# Patient Record
Sex: FEMALE | Race: WHITE | Employment: UNEMPLOYED | URBAN - NONMETROPOLITAN AREA
[De-identification: names, ages, dates, MRNs, and addresses within clinical notes are randomized per-mention and may not be internally consistent; named-entity substitution may affect disease eponyms.]

---
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
## 2022-12-29 ENCOUNTER — OFFICE VISIT (OUTPATIENT)
Dept: FAMILY MEDICINE CLINIC | Age: 22
End: 2022-12-29
Payer: COMMERCIAL

## 2022-12-29 VITALS
BODY MASS INDEX: 19.33 KG/M2 | OXYGEN SATURATION: 98 % | HEIGHT: 65 IN | SYSTOLIC BLOOD PRESSURE: 140 MMHG | DIASTOLIC BLOOD PRESSURE: 90 MMHG | WEIGHT: 116 LBS | HEART RATE: 100 BPM

## 2022-12-29 DIAGNOSIS — Z83.2 FAMILY HISTORY OF IRON DEFICIENCY: ICD-10-CM

## 2022-12-29 DIAGNOSIS — Z00.00 ENCOUNTER FOR WELL ADULT EXAM WITHOUT ABNORMAL FINDINGS: Primary | ICD-10-CM

## 2022-12-29 DIAGNOSIS — Z91.018 NUT ALLERGY: ICD-10-CM

## 2022-12-29 DIAGNOSIS — R53.83 OTHER FATIGUE: ICD-10-CM

## 2022-12-29 PROCEDURE — 99395 PREV VISIT EST AGE 18-39: CPT

## 2022-12-29 RX ORDER — EPINEPHRINE 0.3 MG/.3ML
INJECTION SUBCUTANEOUS
Qty: 2 EACH | Refills: 1 | Status: SHIPPED | OUTPATIENT
Start: 2022-12-29

## 2022-12-29 SDOH — ECONOMIC STABILITY: FOOD INSECURITY: WITHIN THE PAST 12 MONTHS, THE FOOD YOU BOUGHT JUST DIDN'T LAST AND YOU DIDN'T HAVE MONEY TO GET MORE.: NEVER TRUE

## 2022-12-29 SDOH — ECONOMIC STABILITY: FOOD INSECURITY: WITHIN THE PAST 12 MONTHS, YOU WORRIED THAT YOUR FOOD WOULD RUN OUT BEFORE YOU GOT MONEY TO BUY MORE.: NEVER TRUE

## 2022-12-29 ASSESSMENT — PATIENT HEALTH QUESTIONNAIRE - PHQ9
SUM OF ALL RESPONSES TO PHQ QUESTIONS 1-9: 0
1. LITTLE INTEREST OR PLEASURE IN DOING THINGS: 0
2. FEELING DOWN, DEPRESSED OR HOPELESS: 0
SUM OF ALL RESPONSES TO PHQ9 QUESTIONS 1 & 2: 0
SUM OF ALL RESPONSES TO PHQ QUESTIONS 1-9: 0

## 2022-12-29 ASSESSMENT — ENCOUNTER SYMPTOMS
COLOR CHANGE: 0
SINUS PRESSURE: 0
SINUS PAIN: 0
SHORTNESS OF BREATH: 0
WHEEZING: 0
CHEST TIGHTNESS: 0
CONSTIPATION: 0
BACK PAIN: 0
EYE DISCHARGE: 0
ABDOMINAL PAIN: 0
RHINORRHEA: 0
DIARRHEA: 0
NAUSEA: 0
COUGH: 0
EYE ITCHING: 0

## 2022-12-29 ASSESSMENT — SOCIAL DETERMINANTS OF HEALTH (SDOH): HOW HARD IS IT FOR YOU TO PAY FOR THE VERY BASICS LIKE FOOD, HOUSING, MEDICAL CARE, AND HEATING?: NOT HARD AT ALL

## 2022-12-29 NOTE — PROGRESS NOTES
Well Adult Note  Name: Jose F Cantu Date: 2022   MRN: [id number] Sex: Female   Age: 23 y.o. Ethnicity: Non- / Non    : 2000 Race: White (non-)      Yolie Yip is here for well adult exam.  History:  Slight fatigue, family history of iron deficiency anemia, nut allergy      Review of Systems   Constitutional:  Positive for fatigue. Negative for chills, fever and unexpected weight change. HENT:  Negative for congestion, ear pain, rhinorrhea, sinus pressure and sinus pain. Eyes:  Negative for discharge, itching and visual disturbance. Respiratory:  Negative for cough, chest tightness, shortness of breath and wheezing. Cardiovascular:  Negative for chest pain, palpitations and leg swelling. Gastrointestinal:  Negative for abdominal pain, constipation, diarrhea and nausea. Endocrine: Negative for cold intolerance, heat intolerance, polydipsia and polyphagia. Genitourinary:  Negative for dysuria, flank pain and frequency. Musculoskeletal:  Negative for arthralgias, back pain and myalgias. Skin:  Negative for color change. Allergic/Immunologic: Negative for environmental allergies and food allergies. Neurological:  Negative for dizziness, weakness, light-headedness, numbness and headaches. Psychiatric/Behavioral:  Negative for agitation, behavioral problems and confusion. The patient is not nervous/anxious. Allergies   Allergen Reactions    Other Other (See Comments)     Tree nuts; had allergy testing when younger that showed tree nut allergy so she has never had any to see what reaction    Peanut-Containing Drug Products Hives         Prior to Visit Medications    Medication Sig Taking?  Authorizing Provider   EPINEPHrine (EPIPEN) 0.3 MG/0.3ML SOAJ injection Use as directed for allergic reaction Yes JORGE Mcdonough - CNP         Past Medical History:   Diagnosis Date    H/O pulmonary artery stenosis      [facility]     Peanut allergy 2006    Tree nut allergy 2006       History reviewed. No pertinent surgical history. Family History   Problem Relation Age of Onset    Allergies Brother         peanut allergy    Allergies Mother     Asthma Paternal Aunt     Asthma Maternal Cousin     Asthma Paternal Cousin        Social History     Tobacco Use    Smoking status: Never    Smokeless tobacco: Never       Objective   BP (!) 140/90 (Site: Right Upper Arm, Position: Sitting, Cuff Size: Medium Adult)   Pulse 100   Ht 5' 5\" (1.651 m)   Wt 116 lb (52.6 kg)   SpO2 98%   BMI 19.30 kg/m²   Wt Readings from Last 3 Encounters:   12/29/22 116 lb (52.6 kg)   07/26/19 118 lb (53.5 kg) (43 %, Z= -0.17)*   05/29/18 117 lb (53.1 kg) (49 %, Z= -0.03)*     * Growth percentiles are based on [facility] (Girls, 2-20 Years) data. There were no vitals filed for this visit. Physical Exam  Vitals and nursing note reviewed. Constitutional:       General: She is not in acute distress. Appearance: Normal appearance. She is not ill-appearing. HENT:      Head: Normocephalic and atraumatic. Right Ear: Tympanic membrane and external ear normal.      Left Ear: Tympanic membrane and external ear normal.      Nose: Nose normal. No congestion or rhinorrhea. Mouth/Throat:      Mouth: Mucous membranes are moist.      Pharynx: Oropharynx is clear. No posterior oropharyngeal erythema. Eyes:      Pupils: Pupils are equal, round, and reactive to light. Cardiovascular:      Rate and Rhythm: Normal rate and regular rhythm. Pulses: Normal pulses. Heart sounds: Normal heart sounds. Pulmonary:      Effort: Pulmonary effort is normal.      Breath sounds: Normal breath sounds. No wheezing or rhonchi. Abdominal:      General: Bowel sounds are normal.      Palpations: There is no mass. Tenderness: There is no abdominal tenderness. Musculoskeletal:         General: No swelling or tenderness. Normal range of motion.       Cervical back: Normal range of motion. No rigidity or tenderness. Skin:     General: Skin is warm and dry. Capillary Refill: Capillary refill takes less than 2 seconds. Coloration: Skin is pale. Findings: No erythema. Neurological:      General: No focal deficit present. Mental Status: She is alert and oriented to person, place, and time. Psychiatric:         Mood and Affect: Mood normal.         Behavior: Behavior normal.         Thought Content: Thought content normal.         Judgment: Judgment normal.         Assessment   Plan   1. Encounter for well adult exam without abnormal findings  2. Nut allergy  -     EPINEPHrine (EPIPEN) 0.3 MG/0.3ML SOAJ injection; Use as directed for allergic reaction, Disp-2 each, R-1Dispense up to 3 duo packsNormal  3. Other fatigue  -     CBC with Auto Differential; Future  -     Comprehensive Metabolic Panel, Fasting; Future  4.  Family history of iron deficiency  -     CBC with Auto Differential; Future         Personalized Preventive Plan   Current Health Maintenance Status  Immunization History   Administered Date(s) Administered    COVID-19, PFIZER GRAY top, DO NOT Dilute, (age 15 y+), IM, 30 mcg/0.3 mL 07/27/2022, 08/17/2022    DTaP 2002, 02/05/2003, 04/02/2003, 01/07/2004, 06/27/2007    Hepatitis A 04/09/2010, 09/12/2014    Hepatitis B 2000, 2002, 04/02/2003    Hib, unspecified 2002, 02/05/2003, 04/02/2003, 11/05/2003, 11/06/2008    Influenza Nasal 11/19/2010, 12/17/2010    MMR 11/05/2003, 06/27/2007    Meningococcal B, OMV (Bexsero) 07/26/2019    Meningococcal MCV4O (Menveo) 07/26/2019    Meningococcal MCV4P (Menactra) 09/12/2014    Polio IPV (IPOL) 2002, 02/05/2003, 04/02/2003, 06/27/2007    Tdap (Boostrix, Adacel) 09/12/2014    Varicella (Varivax) 11/05/2003, 04/09/2010        Health Maintenance   Topic Date Due    HPV vaccine (1 - 2-dose series) Never done    Depression Screen  Never done    HIV screen  Never done    Chlamydia/GC screen Never done    Hepatitis C screen  Never done    COVID-19 Vaccine (3 - Booster for Alvarado Peter series) 12/29/2023 (Originally 10/12/2022)    DTaP/Tdap/Td vaccine (7 - Td or Tdap) 09/12/2024    Hepatitis A vaccine  Completed    Hib vaccine  Completed    Varicella vaccine  Completed    Meningococcal (ACWY) vaccine  Completed    Flu vaccine  Completed    Pneumococcal 0-64 years Vaccine  Aged Out     Recommendations for Preventive Services Due: see orders and patient instructions/AVS.    Return in about 1 year (around 12/29/2023), or if symptoms worsen or fail to improve.

## 2023-03-29 ENCOUNTER — OFFICE VISIT (OUTPATIENT)
Dept: FAMILY MEDICINE CLINIC | Age: 23
End: 2023-03-29
Payer: COMMERCIAL

## 2023-03-29 VITALS
WEIGHT: 113 LBS | DIASTOLIC BLOOD PRESSURE: 84 MMHG | SYSTOLIC BLOOD PRESSURE: 130 MMHG | BODY MASS INDEX: 18.8 KG/M2 | TEMPERATURE: 99.6 F | HEART RATE: 112 BPM | OXYGEN SATURATION: 97 %

## 2023-03-29 DIAGNOSIS — J02.0 ACUTE STREPTOCOCCAL PHARYNGITIS: Primary | ICD-10-CM

## 2023-03-29 DIAGNOSIS — J02.9 SORE THROAT: ICD-10-CM

## 2023-03-29 LAB — S PYO AG THROAT QL: POSITIVE

## 2023-03-29 PROCEDURE — 87880 STREP A ASSAY W/OPTIC: CPT | Performed by: NURSE PRACTITIONER

## 2023-03-29 PROCEDURE — 99213 OFFICE O/P EST LOW 20 MIN: CPT | Performed by: NURSE PRACTITIONER

## 2023-03-29 RX ORDER — AMOXICILLIN 400 MG/5ML
500 POWDER, FOR SUSPENSION ORAL 2 TIMES DAILY
Qty: 126 ML | Refills: 0 | Status: SHIPPED | OUTPATIENT
Start: 2023-03-29 | End: 2023-04-08

## 2023-03-29 SDOH — ECONOMIC STABILITY: FOOD INSECURITY: WITHIN THE PAST 12 MONTHS, THE FOOD YOU BOUGHT JUST DIDN'T LAST AND YOU DIDN'T HAVE MONEY TO GET MORE.: NEVER TRUE

## 2023-03-29 SDOH — ECONOMIC STABILITY: HOUSING INSECURITY
IN THE LAST 12 MONTHS, WAS THERE A TIME WHEN YOU DID NOT HAVE A STEADY PLACE TO SLEEP OR SLEPT IN A SHELTER (INCLUDING NOW)?: NO

## 2023-03-29 SDOH — ECONOMIC STABILITY: FOOD INSECURITY: WITHIN THE PAST 12 MONTHS, YOU WORRIED THAT YOUR FOOD WOULD RUN OUT BEFORE YOU GOT MONEY TO BUY MORE.: NEVER TRUE

## 2023-03-29 SDOH — ECONOMIC STABILITY: INCOME INSECURITY: HOW HARD IS IT FOR YOU TO PAY FOR THE VERY BASICS LIKE FOOD, HOUSING, MEDICAL CARE, AND HEATING?: NOT HARD AT ALL

## 2023-03-29 ASSESSMENT — PATIENT HEALTH QUESTIONNAIRE - PHQ9
SUM OF ALL RESPONSES TO PHQ QUESTIONS 1-9: 2
2. FEELING DOWN, DEPRESSED OR HOPELESS: 1
SUM OF ALL RESPONSES TO PHQ QUESTIONS 1-9: 2
SUM OF ALL RESPONSES TO PHQ QUESTIONS 1-9: 2
1. LITTLE INTEREST OR PLEASURE IN DOING THINGS: 1
SUM OF ALL RESPONSES TO PHQ QUESTIONS 1-9: 2
SUM OF ALL RESPONSES TO PHQ9 QUESTIONS 1 & 2: 2

## 2023-03-29 ASSESSMENT — ENCOUNTER SYMPTOMS
SWOLLEN GLANDS: 1
ABDOMINAL PAIN: 0
SORE THROAT: 1
RHINORRHEA: 0
COUGH: 0
NAUSEA: 0
VOMITING: 0